# Patient Record
Sex: FEMALE
[De-identification: names, ages, dates, MRNs, and addresses within clinical notes are randomized per-mention and may not be internally consistent; named-entity substitution may affect disease eponyms.]

---

## 2022-08-10 ENCOUNTER — APPOINTMENT (OUTPATIENT)
Dept: OBGYN | Facility: CLINIC | Age: 32
End: 2022-08-10

## 2022-08-10 VITALS
HEIGHT: 63 IN | DIASTOLIC BLOOD PRESSURE: 82 MMHG | WEIGHT: 101 LBS | BODY MASS INDEX: 17.89 KG/M2 | HEART RATE: 91 BPM | SYSTOLIC BLOOD PRESSURE: 127 MMHG

## 2022-08-10 DIAGNOSIS — Z01.419 ENCOUNTER FOR GYNECOLOGICAL EXAMINATION (GENERAL) (ROUTINE) W/OUT ABNORMAL FINDINGS: ICD-10-CM

## 2022-08-10 PROBLEM — Z00.00 ENCOUNTER FOR PREVENTIVE HEALTH EXAMINATION: Status: ACTIVE | Noted: 2022-08-10

## 2022-08-10 PROCEDURE — 58301 REMOVE INTRAUTERINE DEVICE: CPT

## 2022-08-10 PROCEDURE — 99072 ADDL SUPL MATRL&STAF TM PHE: CPT

## 2022-08-10 PROCEDURE — 99385 PREV VISIT NEW AGE 18-39: CPT | Mod: 25

## 2022-08-10 NOTE — PROCEDURE
[Cervical Pap Smear] : cervical Pap smear [Liquid Base] : liquid base [IUD Removal] : intrauterine device (IUD) removal [Time out performed] : Pre-procedure time out performed.  Patient's name, date of birth and procedure confirmed. [Consent Obtained] : Consent obtained [ IUD] :  IUD [Risks] : risks [Benefits] : benefits [Alternatives] : alternatives [Patient] : patient [IUD Discarded] : IUD discarded [Sent to Pathology] : specimen was placed in buffered formalin and sent for pathology [Tolerated Well] : Patient tolerated the procedure well [No Complications] : no complications [Heavy Vaginal Bleeding] : for heavy vaginal bleeding [Pelvic Pain] : for pelvic pain [de-identified] : strings not seen, unable to be exposed with cytobrush, with two passes of a Meghan entire IUD was removed

## 2022-08-10 NOTE — PLAN
[FreeTextEntry1] : 32 year old here for annual, IUD removed today uncomplicated\par -pap/hpv performed\par -baseline mammogram at 37\par -discussed immediate return to fertility, discussed [facility]c/ocp alternatives, declining currently\par -reviewed [facility]ics where IUD can be inserted cheaper \par -all questions answered\par Alondra Zapata MD

## 2022-08-10 NOTE — HISTORY OF PRESENT ILLNESS
[TextBox_4] : New pt, annual would like to remove IUD [Currently Active] : currently active [Both] : men and women [No] : No [Yes] : Yes [Condoms] : Condoms [FreeTextEntry1] : 32 year old P0 here for initial visit. Has an IUD in place. States that it was in for contraception, has been over 3 years, desires it to be removed. First time at our office, states does not want another one at this time, wants to check with her insurance company regarding cost. Denies any hx of STIs, states recently had a new sexual partner but had screening several weeks ago that was negative. No other issues today. \par remote hx of abnormal paps, gets regular periods.

## 2022-08-10 NOTE — PROCEDURE
[Cervical Pap Smear] : cervical Pap smear [Liquid Base] : liquid base [IUD Removal] : intrauterine device (IUD) removal [Time out performed] : Pre-procedure time out performed.  Patient's name, date of birth and procedure confirmed. [Consent Obtained] : Consent obtained [ IUD] :  IUD [Risks] : risks [Benefits] : benefits [Alternatives] : alternatives [Patient] : patient [IUD Discarded] : IUD discarded [Sent to Pathology] : specimen was placed in buffered formalin and sent for pathology [Tolerated Well] : Patient tolerated the procedure well [No Complications] : no complications [Heavy Vaginal Bleeding] : for heavy vaginal bleeding [Pelvic Pain] : for pelvic pain [de-identified] : strings not seen, unable to be exposed with cytobrush, with two passes of a Meghan entire IUD was removed

## 2022-08-11 LAB — HPV HIGH+LOW RISK DNA PNL CVX: NOT DETECTED

## 2022-08-14 LAB — CYTOLOGY CVX/VAG DOC THIN PREP: NORMAL
